# Patient Record
Sex: FEMALE | NOT HISPANIC OR LATINO | Employment: UNEMPLOYED | URBAN - METROPOLITAN AREA
[De-identification: names, ages, dates, MRNs, and addresses within clinical notes are randomized per-mention and may not be internally consistent; named-entity substitution may affect disease eponyms.]

---
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
## 2024-04-17 ENCOUNTER — PROCEDURE VISIT (OUTPATIENT)
Dept: OBGYN CLINIC | Facility: MEDICAL CENTER | Age: 19
End: 2024-04-17
Payer: COMMERCIAL

## 2024-04-17 VITALS
SYSTOLIC BLOOD PRESSURE: 110 MMHG | HEIGHT: 66 IN | DIASTOLIC BLOOD PRESSURE: 70 MMHG | BODY MASS INDEX: 28.45 KG/M2 | WEIGHT: 177 LBS

## 2024-04-17 DIAGNOSIS — Z30.432 ENCOUNTER FOR IUD REMOVAL: Primary | ICD-10-CM

## 2024-04-17 PROCEDURE — 58301 REMOVE INTRAUTERINE DEVICE: CPT | Performed by: OBSTETRICS & GYNECOLOGY

## 2024-04-17 NOTE — PROGRESS NOTES
"    Iud removal    Date/Time: 4/17/2024 10:45 AM    Performed by: Cony Alanis MD  Authorized by: Cony Alanis MD  Universal Protocol:  Consent: Verbal consent obtained.  Risks and benefits: risks, benefits and alternatives were discussed  Consent given by: parent and patient  Time out: Immediately prior to procedure a \"time out\" was called to verify the correct patient, procedure, equipment, support staff and site/side marked as required.  Patient understanding: patient states understanding of the procedure being performed  Patient consent: the patient's understanding of the procedure matches consent given  Procedure consent: procedure consent matches procedure scheduled  Patient identity confirmed: verbally with patient    Procedure:     Removed with no complications: yes    Comments:      Patient declines any hormonal birth control for now.  Will use condoms with all intercourse.  Desires to monitor her menses for now.  Recommend patient start routine yearly GYN examx and to make this visit in 3 months.  Patient's mother inquired about patient's history of urinary tract infections.  Recommend if patient is symptomatic to try with cranberry juice and increasing p.o. fluids.  If symptoms persist, recommend patient contact the office to determine follow-up.     "

## 2024-05-31 ENCOUNTER — OCCMED (OUTPATIENT)
Dept: URGENT CARE | Facility: CLINIC | Age: 19
End: 2024-05-31

## 2024-05-31 ENCOUNTER — APPOINTMENT (OUTPATIENT)
Dept: LAB | Facility: CLINIC | Age: 19
End: 2024-05-31

## 2024-05-31 DIAGNOSIS — Z02.1 PRE-EMPLOYMENT EXAMINATION: ICD-10-CM

## 2024-05-31 DIAGNOSIS — Z02.1 PHYSICAL EXAM, PRE-EMPLOYMENT: Primary | ICD-10-CM

## 2024-05-31 LAB
MEV IGG SER QL IA: ABNORMAL
MUV IGG SER QL IA: NORMAL
RUBV IGG SERPL IA-ACNC: 34.8 IU/ML
VZV IGG SER QL IA: ABNORMAL

## 2024-05-31 PROCEDURE — 86735 MUMPS ANTIBODY: CPT

## 2024-05-31 PROCEDURE — 36415 COLL VENOUS BLD VENIPUNCTURE: CPT

## 2024-05-31 PROCEDURE — 86762 RUBELLA ANTIBODY: CPT

## 2024-05-31 PROCEDURE — 86787 VARICELLA-ZOSTER ANTIBODY: CPT

## 2024-05-31 PROCEDURE — 86480 TB TEST CELL IMMUN MEASURE: CPT

## 2024-05-31 PROCEDURE — 86765 RUBEOLA ANTIBODY: CPT

## 2024-06-01 LAB
GAMMA INTERFERON BACKGROUND BLD IA-ACNC: 0.05 IU/ML
M TB IFN-G BLD-IMP: NEGATIVE
M TB IFN-G CD4+ BCKGRND COR BLD-ACNC: 0.01 IU/ML
M TB IFN-G CD4+ BCKGRND COR BLD-ACNC: 0.02 IU/ML
MITOGEN IGNF BCKGRD COR BLD-ACNC: 9.95 IU/ML

## 2024-09-12 ENCOUNTER — TELEPHONE (OUTPATIENT)
Age: 19
End: 2024-09-12

## 2024-09-12 NOTE — TELEPHONE ENCOUNTER
Called patient, left non-detailed vm. Pt submitted online appt request, currently established with [facility] Advised to return call if assistance is needed with scheduling

## 2024-10-24 ENCOUNTER — TELEPHONE (OUTPATIENT)
Dept: FAMILY MEDICINE CLINIC | Facility: CLINIC | Age: 19
End: 2024-10-24

## 2024-11-06 ENCOUNTER — TELEPHONE (OUTPATIENT)
Dept: FAMILY MEDICINE CLINIC | Facility: CLINIC | Age: 19
End: 2024-11-06

## 2025-01-02 ENCOUNTER — APPOINTMENT (OUTPATIENT)
Dept: LAB | Facility: HOSPITAL | Age: 20
End: 2025-01-02
Payer: COMMERCIAL

## 2025-01-02 ENCOUNTER — OFFICE VISIT (OUTPATIENT)
Dept: FAMILY MEDICINE CLINIC | Facility: CLINIC | Age: 20
End: 2025-01-02
Payer: COMMERCIAL

## 2025-01-02 VITALS
HEART RATE: 84 BPM | DIASTOLIC BLOOD PRESSURE: 80 MMHG | SYSTOLIC BLOOD PRESSURE: 112 MMHG | OXYGEN SATURATION: 100 % | TEMPERATURE: 97.8 F

## 2025-01-02 DIAGNOSIS — Z32.01 POSITIVE PREGNANCY TEST: ICD-10-CM

## 2025-01-02 DIAGNOSIS — Z00.00 ANNUAL PHYSICAL EXAM: Primary | ICD-10-CM

## 2025-01-02 LAB — B-HCG SERPL-ACNC: ABNORMAL MIU/ML (ref 0–5)

## 2025-01-02 PROCEDURE — 36415 COLL VENOUS BLD VENIPUNCTURE: CPT

## 2025-01-02 PROCEDURE — 84702 CHORIONIC GONADOTROPIN TEST: CPT

## 2025-01-02 PROCEDURE — 99395 PREV VISIT EST AGE 18-39: CPT

## 2025-01-02 NOTE — LETTER
January 2, 2025     Patient: Jody Randhawa  YOB: 2005  Date of Visit: 1/2/2025      To Whom it May Concern:    Jody Randhawa is under my professional care. Jody was seen in my office on 1/2/2025. Jody may return to work with limitations 01/02/2025 . Patient was diagnosed with pregnancy (Z32.01) on 01/02/24. Please make reasonable accommodations to minimize fetal risk and exposure. Please allow patient to only handle medications safe for pregnancy based on category.     If you have any questions or concerns, please don't hesitate to call.         Sincerely,          USMAN Murray  Office visit on 01/02/24       CC: No Recipients

## 2025-01-02 NOTE — PATIENT INSTRUCTIONS
Patient Education     Pregnancy - The First Month   About this topic   It is important for you to learn how to take care of yourself to help you have a healthy baby and safe delivery. It is good to have health care throughout your pregnancy.  Your pregnancy is counted in many ways, by weeks, months, or trimesters. Your doctor will count your pregnancy in weeks from the first day of your last period. Pregnancy most often lasts 40 weeks or about 9 months. Your first trimester is from week 1 to 13. The second trimester is from week 14 through 27, and the last one is from week 28 through birth. By knowing how far along you are, you can learn what is normal for this stage of your pregnancy and plan for what is next.  General   Your Body   Your body changes and the baby grows inside of you, even before you may know you are pregnant. In the first and second week after your last period, your body is releasing an egg from your ovary. This is called ovulation. As the egg travels down the fallopian tube, if it is met by a sperm, the egg may become fertilized and you are pregnant.  Your doctor will focus on what changes in your body and how your baby grows. During the first month of your pregnancy, here are some things you can expect.  You may:  Miss your period, have some spotting, or have a lighter period  Feel tired  Go to the bathroom often  Have tender or swollen breasts  Have upset stomach or throwing up  Your baby’s growth and development:  Your baby starts as just 2 cells that divide very quickly. About 3 weeks after your last period, the cells will implant into the wall of your uterus.  Cells are dividing into layers that will become your baby's organs, as well as the placenta and umbilical cord.  Your baby is smaller than the head of a pin, but grows very fast.  Things to Think About   Avoid alcohol, drugs, tobacco products, and second hand smoke  Talk to your doctor about all the drugs you take. Be sure to include  all prescription and over-the-counter (OTC) drugs, and herbal supplements.  Ask about taking a vitamin. Take at least 400 micrograms of folic acid each day to help prevent some birth defects.  During your first visit, you may have blood drawn, a pelvic exam, and an ultrasound.  When do I need to call the doctor?   Not able to stop throwing up  Belly pain or cramps that keeps you from eating or sleeping  Very heavy menstrual bleeding  Last Reviewed Date   2020-04-20  Consumer Information Use and Disclaimer   This generalized information is a limited summary of diagnosis, treatment, and/or medication information. It is not meant to be comprehensive and should be used as a tool to help the user understand and/or assess potential diagnostic and treatment options. It does NOT include all information about conditions, treatments, medications, side effects, or risks that may apply to a specific patient. It is not intended to be medical advice or a substitute for the medical advice, diagnosis, or treatment of a health care provider based on the health care provider's examination and assessment of a patient’s specific and unique circumstances. Patients must speak with a health care provider for complete information about their health, medical questions, and treatment options, including any risks or benefits regarding use of medications. This information does not endorse any treatments or medications as safe, effective, or approved for treating a specific patient. UpTo[employer], Inc. and its affiliates disclaim any warranty or liability relating to this information or the use thereof. The use of this information is governed by the Terms of Use, available at https://www.[employer]wer.com/en/know/clinical-effectiveness-terms   Copyright   Copyright © 2024 UpToDate, Inc. and its affiliates and/or licensors. All rights reserved.

## 2025-01-02 NOTE — PROGRESS NOTES
Adult Annual Physical  Name: Jody Randhawa      : 2005      MRN: [id number]  Encounter Provider: USMAN Murray  Encounter Date: 2025   Encounter department: [facility]    Assessment & Plan  Annual physical exam         Positive pregnancy test     Jody Randhawa is a 19 y.o. female who presents for evaluation of amenorrhea. She believes she could be pregnant. Patient is ambivalent about pregnancy. Sexual Activity: single partner, contraception: none. Current symptoms also include: positive home pregnancy test. Last period was normal.     LMP 2024     Urine HCG: positive      Refer to OBGYN for further management   Start prenatal today   Orders:    hCG, quantitative; Future    Immunizations and preventive care screenings were discussed with patient today. Appropriate education was printed on patient's after visit summary.    Counseling:  Alcohol/drug use: discussed moderation in alcohol intake, the recommendations for healthy alcohol use, and avoidance of illicit drug use.  Dental Health: discussed importance of regular tooth brushing, flossing, and dental visits.  Injury prevention: discussed safety/seat belts, safety helmets, smoke detectors, carbon monoxide detectors, and smoking near bedding or upholstery.  Sexual health: discussed sexually transmitted diseases, partner selection, use of condoms, avoidance of unintended pregnancy, and contraceptive alternatives.  Exercise: the importance of regular exercise/physical activity was discussed. Recommend exercise 3-5 times per week for at least 30 minutes.          History of Present Illness     Adult Annual Physical:  Patient presents for annual physical.     Diet and Physical Activity:  - Diet/Nutrition: well balanced diet.  - Exercise: no formal exercise.    Depression Screening:  - PHQ-2 Score: 0    General Health:  - Sleep: sleeps well.  - Hearing: normal hearing bilateral ears.  - Vision: vision  problems, goes for regular eye exams and wears glasses.  - Dental: regular dental visits and brushes teeth twice daily.    /GYN Health:  - Follows with GYN: yes.   - Last menstrual cycle: 11/18/2024.   - History of STDs: no    Advanced Care Planning:  - Has an advanced directive?: no    - Has a durable medical POA?: no    - ACP document given to patient?: no      Review of Systems   Constitutional:  Negative for activity change, chills, fatigue and fever.   HENT:  Negative for congestion, ear pain, rhinorrhea, sore throat and trouble swallowing.    Eyes:  Negative for pain and visual disturbance.   Respiratory:  Negative for cough, chest tightness and shortness of breath.    Cardiovascular:  Negative for chest pain, palpitations and leg swelling.   Gastrointestinal:  Negative for abdominal pain, constipation, diarrhea, nausea and vomiting.   Genitourinary:  Negative for difficulty urinating, dysuria, hematuria and urgency.   Musculoskeletal:  Negative for arthralgias and back pain.   Skin:  Negative for color change and rash.   Neurological:  Negative for dizziness, seizures, syncope and headaches.   Psychiatric/Behavioral:  Negative for dysphoric mood. The patient is not nervous/anxious.    All other systems reviewed and are negative.    Medical History Reviewed by provider this encounter:     .    Objective   /80 (BP Location: Left arm, Patient Position: Sitting, Cuff Size: Adult)   Pulse 84   Temp 97.8 °F (36.6 °C) (Temporal)   SpO2 100%     Physical Exam  Vitals and nursing note reviewed.   Constitutional:       General: She is not in acute distress.     Appearance: Normal appearance. She is well-developed and normal weight.   HENT:      Head: Normocephalic and atraumatic.      Right Ear: Tympanic membrane, ear canal and external ear normal. There is no impacted cerumen.      Left Ear: Tympanic membrane, ear canal and external ear normal. There is no impacted cerumen.      Nose: Nose normal.       Mouth/Throat:      Mouth: Mucous membranes are moist.      Pharynx: Oropharynx is clear.   Eyes:      Extraocular Movements: Extraocular movements intact.      Conjunctiva/sclera: Conjunctivae normal.      Pupils: Pupils are equal, round, and reactive to light.   Cardiovascular:      Rate and Rhythm: Normal rate and regular rhythm.      Pulses: Normal pulses.      Heart sounds: Normal heart sounds. No murmur heard.  Pulmonary:      Effort: Pulmonary effort is normal. No respiratory distress.      Breath sounds: Normal breath sounds.   Abdominal:      General: Bowel sounds are normal.      Palpations: Abdomen is soft.      Tenderness: There is no abdominal tenderness.   Musculoskeletal:         General: Normal range of motion.      Cervical back: Normal range of motion and neck supple.      Right lower leg: No edema.      Left lower leg: No edema.   Skin:     General: Skin is warm and dry.      Capillary Refill: Capillary refill takes less than 2 seconds.   Neurological:      General: No focal deficit present.      Mental Status: She is alert and oriented to person, place, and time. Mental status is at baseline.   Psychiatric:         Mood and Affect: Mood normal.         Behavior: Behavior normal.         Thought Content: Thought content normal.         Judgment: Judgment normal.     Administrative Statements   I have spent a total time of 40 minutes in caring for this patient on the day of the visit/encounter including Diagnostic results, Prognosis, Risks and benefits of tx options, Instructions for management, Patient and family education, Importance of tx compliance, Risk factor reductions, Impressions, Counseling / Coordination of care, Documenting in the medical record, Reviewing / ordering tests, medicine, procedures  , and Obtaining or reviewing history  .     Patient Instructions   Patient Education     Pregnancy - The First Month   About this topic   It is important for you to learn how to take care of  yourself to help you have a healthy baby and safe delivery. It is good to have health care throughout your pregnancy.  Your pregnancy is counted in many ways, by weeks, months, or trimesters. Your doctor will count your pregnancy in weeks from the first day of your last period. Pregnancy most often lasts 40 weeks or about 9 months. Your first trimester is from week 1 to 13. The second trimester is from week 14 through 27, and the last one is from week 28 through birth. By knowing how far along you are, you can learn what is normal for this stage of your pregnancy and plan for what is next.  General   Your Body   Your body changes and the baby grows inside of you, even before you may know you are pregnant. In the first and second week after your last period, your body is releasing an egg from your ovary. This is called ovulation. As the egg travels down the fallopian tube, if it is met by a sperm, the egg may become fertilized and you are pregnant.  Your doctor will focus on what changes in your body and how your baby grows. During the first month of your pregnancy, here are some things you can expect.  You may:  Miss your period, have some spotting, or have a lighter period  Feel tired  Go to the bathroom often  Have tender or swollen breasts  Have upset stomach or throwing up  Your baby’s growth and development:  Your baby starts as just 2 cells that divide very quickly. About 3 weeks after your last period, the cells will implant into the wall of your uterus.  Cells are dividing into layers that will become your baby's organs, as well as the placenta and umbilical cord.  Your baby is smaller than the head of a pin, but grows very fast.  Things to Think About   Avoid alcohol, drugs, tobacco products, and second hand smoke  Talk to your doctor about all the drugs you take. Be sure to include all prescription and over-the-counter (OTC) drugs, and herbal supplements.  Ask about taking a vitamin. Take at least 400  micrograms of folic acid each day to help prevent some birth defects.  During your first visit, you may have blood drawn, a pelvic exam, and an ultrasound.  When do I need to call the doctor?   Not able to stop throwing up  Belly pain or cramps that keeps you from eating or sleeping  Very heavy menstrual bleeding  Last Reviewed Date   2020-04-20  Consumer Information Use and Disclaimer   This generalized information is a limited summary of diagnosis, treatment, and/or medication information. It is not meant to be comprehensive and should be used as a tool to help the user understand and/or assess potential diagnostic and treatment options. It does NOT include all information about conditions, treatments, medications, side effects, or risks that may apply to a specific patient. It is not intended to be medical advice or a substitute for the medical advice, diagnosis, or treatment of a health care provider based on the health care provider's examination and assessment of a patient’s specific and unique circumstances. Patients must speak with a health care provider for complete information about their health, medical questions, and treatment options, including any risks or benefits regarding use of medications. This information does not endorse any treatments or medications as safe, effective, or approved for treating a specific patient. UpToDate, Inc. and its affiliates disclaim any warranty or liability relating to this information or the use thereof. The use of this information is governed by the Terms of Use, available at https://www.[employer].com/en/know/clinical-effectiveness-terms   Copyright   Copyright © 2024 UpToDate, Inc. and its affiliates and/or licensors. All rights reserved.

## 2025-01-03 ENCOUNTER — TELEPHONE (OUTPATIENT)
Age: 20
End: 2025-01-03

## 2025-01-03 NOTE — TELEPHONE ENCOUNTER
Pt called in , advised pt if she had taken a look at her results on my chart pt stated yes ,She had further questions as to how far along she was and what should be her next step. Explained pcp was no longer in office for the day and we will get back to her .

## 2025-01-03 NOTE — TELEPHONE ENCOUNTER
Patient called requesting a call back from USMAN Murray to discuss her hCG, quantitative lab results. Please call her to advise.

## 2025-01-06 ENCOUNTER — OFFICE VISIT (OUTPATIENT)
Dept: FAMILY MEDICINE CLINIC | Facility: CLINIC | Age: 20
End: 2025-01-06
Payer: COMMERCIAL

## 2025-01-06 ENCOUNTER — TELEPHONE (OUTPATIENT)
Age: 20
End: 2025-01-06

## 2025-01-06 VITALS
OXYGEN SATURATION: 99 % | HEART RATE: 97 BPM | BODY MASS INDEX: 28.12 KG/M2 | WEIGHT: 175 LBS | HEIGHT: 66 IN | SYSTOLIC BLOOD PRESSURE: 126 MMHG | TEMPERATURE: 98.4 F | RESPIRATION RATE: 18 BRPM | DIASTOLIC BLOOD PRESSURE: 82 MMHG

## 2025-01-06 DIAGNOSIS — Z32.01 POSITIVE PREGNANCY TEST: Primary | ICD-10-CM

## 2025-01-06 PROCEDURE — 99213 OFFICE O/P EST LOW 20 MIN: CPT

## 2025-01-06 NOTE — PROGRESS NOTES
Name: Jody Randhawa      : 2005      MRN: [id number]  Encounter Provider: USMAN Murray  Encounter Date: 2025   Encounter department: [facility]  :  Assessment & Plan  Positive pregnancy test   Jody Randhawa is a 19 y.o. female who presents for evaluation of amenorrhea. She believes she could be pregnant. Patient is ambivalent about pregnancy. Sexual Activity: single partner, contraception: none. Current symptoms also include: positive home pregnancy test. Last period was normal.     LMP 2024 Urine HCG: positive   Quantitative HC,962.7  Orders:    Ambulatory Referral to Obstetrics / Gynecology; Future          Depression Screening and Follow-up Plan: Patient was screened for depression during today's encounter. They screened negative with a PHQ-2 score of 0.      History of Present Illness     Follow up exam       Review of Systems   Constitutional:  Negative for activity change, chills, fatigue and fever.   HENT:  Negative for congestion, ear pain, rhinorrhea, sore throat and trouble swallowing.    Eyes:  Negative for pain and visual disturbance.   Respiratory:  Negative for cough, chest tightness and shortness of breath.    Cardiovascular:  Negative for chest pain, palpitations and leg swelling.   Gastrointestinal:  Negative for abdominal pain, constipation, diarrhea, nausea and vomiting.   Genitourinary:  Negative for difficulty urinating, dysuria, hematuria and urgency.   Musculoskeletal:  Negative for arthralgias and back pain.   Skin:  Negative for color change and rash.   Neurological:  Negative for dizziness, seizures, syncope and headaches.   Psychiatric/Behavioral:  Negative for dysphoric mood. The patient is not nervous/anxious.    All other systems reviewed and are negative.      Objective   /82 (BP Location: Left arm, Patient Position: Sitting, Cuff Size: Standard)   Pulse 97   Temp 98.4 °F (36.9 °C)  "(Temporal)   Resp 18   Ht 5' 5.5\" (1.664 m)   Wt 79.4 kg (175 lb)   SpO2 99%   BMI 28.68 kg/m²      Physical Exam  Vitals and nursing note reviewed.   Constitutional:       General: She is not in acute distress.     Appearance: Normal appearance. She is well-developed and normal weight.   HENT:      Head: Normocephalic and atraumatic.      Right Ear: Tympanic membrane, ear canal and external ear normal. There is no impacted cerumen.      Left Ear: Tympanic membrane, ear canal and external ear normal. There is no impacted cerumen.      Nose: Nose normal.      Mouth/Throat:      Mouth: Mucous membranes are moist.      Pharynx: Oropharynx is clear.   Eyes:      Extraocular Movements: Extraocular movements intact.      Conjunctiva/sclera: Conjunctivae normal.      Pupils: Pupils are equal, round, and reactive to light.   Cardiovascular:      Rate and Rhythm: Normal rate and regular rhythm.      Pulses: Normal pulses.      Heart sounds: Normal heart sounds. No murmur heard.  Pulmonary:      Effort: Pulmonary effort is normal. No respiratory distress.      Breath sounds: Normal breath sounds.   Abdominal:      General: Bowel sounds are normal.      Palpations: Abdomen is soft.      Tenderness: There is no abdominal tenderness.   Musculoskeletal:         General: Normal range of motion.      Cervical back: Normal range of motion and neck supple.      Right lower leg: No edema.      Left lower leg: No edema.   Skin:     General: Skin is warm and dry.      Capillary Refill: Capillary refill takes less than 2 seconds.   Neurological:      General: No focal deficit present.      Mental Status: She is alert and oriented to person, place, and time. Mental status is at baseline.   Psychiatric:         Mood and Affect: Mood normal.         Behavior: Behavior normal.         Thought Content: Thought content normal.         Judgment: Judgment normal.       Administrative Statements   I have spent a total time of 20 minutes in " caring for this patient on the day of the visit/encounter including Diagnostic results, Prognosis, Risks and benefits of tx options, Instructions for management, Patient and family education, Importance of tx compliance, Risk factor reductions, Impressions, Counseling / Coordination of care, Documenting in the medical record, Reviewing / ordering tests, medicine, procedures  , and Obtaining or reviewing history  .

## 2025-01-06 NOTE — ASSESSMENT & PLAN NOTE
Jody Randhawa is a 19 y.o. female who presents for evaluation of amenorrhea. She believes she could be pregnant. Patient is ambivalent about pregnancy. Sexual Activity: single partner, contraception: none. Current symptoms also include: positive home pregnancy test. Last period was normal.     LMP 2024 Urine HCG: positive   Quantitative HC,962.7  Orders:    Ambulatory Referral to Obstetrics / Gynecology; Future

## 2025-01-06 NOTE — TELEPHONE ENCOUNTER
Already a current pt ref by PCP for a + preg test LMP 11/18/24 first available for Data and Via is 2/20/25 pt will be way over her 10 weeks by this appt. Please call pt to sched sooner as she is 7 weeks today per LMP pt did state PCP told her she is about 5 weeks but preg wheel is showing 7 weeks today.

## 2025-01-06 NOTE — LETTER
January 6, 2025     Patient: Jody Randhawa  YOB: 2005  Date of Visit: 1/6/2025      To Whom it May Concern:    Jody Randhawa is under my professional care. Jody was seen in my office on 1/6/2025.   Jody may return to work with limitations 01/06/2025 . Patient was diagnosed with pregnancy (Z32.01) on 01/02/24. Please make reasonable accommodations to minimize fetal risk and exposure, allow for 15 minute breaks twice a shift within first and second ramirez. Please allow patient to only handle medications safe for pregnancy based on category.         If you have any questions or concerns, please don't hesitate to call.         Sincerely,          USMAN Murray        CC: No Recipients

## 2025-01-08 ENCOUNTER — TELEPHONE (OUTPATIENT)
Age: 20
End: 2025-01-08

## 2025-01-08 NOTE — TELEPHONE ENCOUNTER
Addressed today.  Letter created and handed to patient   fever for 3 days, febrile seizure yesterday, seen at [facility] and obs'd and d/c to home; here today b/c fever continues; good PO and UOP    lungs clear, wet cough noted, congestion noted

## 2025-01-08 NOTE — TELEPHONE ENCOUNTER
"Pt needs an update on this letter as soon as possible to provide before her shift at 3pm today. If the last line on parking accommodations can specifically state \" patient must be allowed to park in the visitor parking lot including handicap spaces if the expectant mother spaces are not available\"    Please fax asap to pt's work [phone]   "

## 2025-01-08 NOTE — TELEPHONE ENCOUNTER
Patient called and stated unfortunately her employer is not accepting her letter from pcp in regards to making accommodations based on patients recent diagnosis. Patient stated they are requiring patient to have specifics listed in the letter. Patient made an appointment this afternoon with pcp to discuss further.

## 2025-02-06 ENCOUNTER — ULTRASOUND (OUTPATIENT)
Dept: OBGYN CLINIC | Facility: MEDICAL CENTER | Age: 20
End: 2025-02-06
Payer: COMMERCIAL

## 2025-02-06 ENCOUNTER — TELEPHONE (OUTPATIENT)
Age: 20
End: 2025-02-06

## 2025-02-06 VITALS
HEIGHT: 66 IN | DIASTOLIC BLOOD PRESSURE: 60 MMHG | BODY MASS INDEX: 29.09 KG/M2 | WEIGHT: 181 LBS | SYSTOLIC BLOOD PRESSURE: 110 MMHG

## 2025-02-06 DIAGNOSIS — Z32.01 PREGNANCY EXAMINATION OR TEST, POSITIVE RESULT: Primary | ICD-10-CM

## 2025-02-06 DIAGNOSIS — Z32.01 POSITIVE PREGNANCY TEST: ICD-10-CM

## 2025-02-06 PROCEDURE — 76817 TRANSVAGINAL US OBSTETRIC: CPT | Performed by: OBSTETRICS & GYNECOLOGY

## 2025-02-06 PROCEDURE — 99214 OFFICE O/P EST MOD 30 MIN: CPT | Performed by: OBSTETRICS & GYNECOLOGY

## 2025-02-06 NOTE — PROGRESS NOTES
"Pregnancy Confirmation Visit  [facility] [address], [place], [place]    Assessment/Plan:  19 y.o.  presenting with missed menses.      Patient's last menstrual period was 2024 (exact date).  EDC - 25   @ 11w1d  Sonogram EDC        Final EDC 25    by    LMP            - Continue/start prenatal vitamin  - We reviewed her current medications and discussed which are safe to continue in pregnancy  - We briefly discussed options for aneuploidy screening, to be discussed further at the prenatal intake  - Schedule prenatal intake with RN and initial prenatal visit; prenatal labs will be ordered during the prenatal intake      Subjective:    CC: Missed period    Jody Randhawa is a 19 y.o.  who presents with missed menses.  Patient's last menstrual period was 2024 (exact date).    Patient notes that this pregnancy was planned and desired.  She was not using contraception at the time of conception. She reports she is certain of her LMP and that she has regular menses. She has has no vaginal bleeding since her LMP.    Objective:  /60   Ht 5' 5.5\" (1.664 m)   Wt 82.1 kg (181 lb)   LMP 2024 (Exact Date)   BMI 29.66 kg/m²     Physical Exam:  General: Well appearing, no distress  CV: Regular rate  Respiratory: Unlabored breathing  Abdomen: Soft, nontender  Extremities: Without edema  Mood and Affect: Appropriate    Transvaginal Pelvic Ultrasound  Cooper IUP  Yolk sac: Present  Fetal Pole: Present  CRL consistent with EGA   Cardiac activity: Present   bpm  No adnexal masses appreciated          "

## 2025-02-06 NOTE — TELEPHONE ENCOUNTER
Patient asking for another referral for OBGYN that can get her in for an appt today. She states she was not able to be seen by the original referring doctor.

## 2025-02-06 NOTE — TELEPHONE ENCOUNTER
Due to inclement weather office has a later start today , I will be forwarding your message to pcp

## 2025-02-07 NOTE — PROGRESS NOTES
OB INTAKE INTERVIEW February 10, 2025    Patient is 19 y.o. who presents for OB intake at 12w0d.  She is accompanied by her mother during this encounter.  The father of her baby (Indra Curtis) is involved in the pregnancy.      Patient's last menstrual period was 2024 (exact date).  Ultrasound: Measured 11 weeks 1 days on 2025   Estimated Date of Delivery: 25 confirmed by dating ultrasound. 11 week US by NEL    Signs/Symptoms of Pregnancy  Current pregnancy symptoms: breast tenderness, fatigue, nausea, and frequent urination  Headaches: YES - relieved w/fluids and rest  Cramping: YES - light/occasional  Spotting: no  PICA cravings: no    Diabetes:  Body mass index is 29.66 kg/m².  If patient has 1 or more, please order early 1 hour GTT  History of GDM: no  BMI >35 no  History of PCOS or current metformin use: no  History of LGA/macrosomic infant (4000g/9lbs): no    If patient has 2 or more, please order early 1 hour GTT  BMI>30 no - pregravid BMI 29.6  AMA: no  First degree relative with type 2 diabetes: YES  History of chronic HTN, hyperlipidemia, elevated A1C: no  High risk race (, , ,  or ): YES - pt     Hypertension: if you answer yes to any of the following, please order baseline preeclampsia labs (cbc, comprehensive metabolic panel, urine protein creatinine ratio, uric acid)  History of of chronic HTN: no  History of gestational HTN: no  History of preeclampsia, eclampsia, or HELLP syndrome: no  History of diabetes: no  History of lupus,sjogrens syndrome, kidney disease no    Thyroid: if yes order TSH with reflex T4  History of thyroid disease: no    Bleeding Disorder or Hx of DVT - patient or first degree relative with history of. Order the following if not done previously.   (Factor V, antithrombin III, prothrombin gene mutation, protein C and S Ag, lupus anticoagulant, anticardiolipin, beta-2  glycoprotein):   no    OB/GYN:  History of abnormal pap smear: no       Date of last pap smear: Never had pap  History of HPV: no  History of Herpes/HSV: no  History of other STI: (gonorrhea, chlamydia, trich) no  History of prior : no  History of prior : no  History of  delivery prior to 36 weeks 6 days: no  History of blood transfusion: no  Ok for blood transfusion: YES    Substance screening-   History of tobacco use no  Currently using tobacco no  Substance Use Screen Level:  No Risk    MRSA Screening:   Does the pt have a hx of MRSA? no    Mental Health:  Hx of/or current dx of depression: no  Hx of/or current dx of anxiety: no  Medications: no   EPDS Screen:  Negative / score: 1    Dental Health:  Patient has seen a dentist in the past 6 months: no    Immunizations:  Influenza vaccine given this season: NO   Discussed Tdap vaccine:  YES  Discussed COVID Vaccine:  YES -had in the past  History of Varicella or Vaccination had varicella vaccine    Genetic/M:  Do you or your partner have a history of any of the following in yourselves or first degree relatives?  Cystic fibrosis: no  Spinal muscular atrophy: no  Hemoglobinopathy/Sickle Cell/Thalassemia: no  Fragile X Intellectual Disability: no    Discussed Carrier Screening being completed once in a lifetime as a standard of care lab test. Place orders for Cystic Fibrosis Gene Test (OHQ854) and Spinal Muscular Atrophy DNA (FOD4410).  Patient was informed that prior authorization needs to be completed for these tests and this may take 7-10 business days.  Patient does not desire testing for Cystic Fibrosis and Spinal Muscular Atrophy at this time.    ACOG Patient Education Cystic Fibrosis and Spinal Muscular Atrophy prenatal screening given.    Appointment for Nuchal Translucency Ultrasound at [facility] is scheduled for 25.    Interview education:  [facility] Pregnancy Essentials Book reviewed, discussed and attached to their AVS: YES      Nurse/Family Partnership-patient may qualify YES; referral placed NO - discussed, brochure given    Prenatal lab work scripts: YES - at D/V    Extra labs ordered: 1 hour GTT    Aspirin/Preeclampsia Screen    Risk Level Risk Factor Recommendation   LOW Prior Uncomplicated full-term delivery no No Aspirin recommendation        MODERATE Nulliparity YES Recommend low-dose aspirin if     BMI>30 no - pregravid BMI 29.66 2 or more moderate risk factors    Family History Preeclampsia (mother/sister) no     35yr old or greater no     Black Race, Concern for SDOH/Low Socioeconomic no     IVF Pregnancy  no     Personal History Risks (low birth weight, prior adverse preg outcome, >10yr preg interval) no         HIGH History of Preeclampsia no Recommend low-dose aspirin if     Multifetal gestation no 1 or more high risk factors    Chronic HTN no     Type 1 or 2 Diabetes no     Renal Disease no     Autoimmune Disease  no      Contraindications to ASA therapy:  NSAID/ ASA allergy: no  Nasal polyps: no  Asthma with history of ASA induced bronchospasm: no    Relative contraindications:  History of GI bleed: no  Active peptic ulcer disease: no  Severe hepatic dysfunction: no    Patient does not meet recommendation to take ASA 162mg during this pregnancy from 12-36 wks to lower her risk of preeclampsia.  Instructions given and patient verbalized understanding.    The patient has a history now or in prior pregnancy notable for:  family hx DM, pregravid BMI 29.66       Details that I feel the provider should be aware of: Jody was seen here in office for her OB Intake visit, Hx obtained, c/o slight nausea  discussed avoiding an empty belly.  Discussed need for 2 tabs LDASA therapy d/t elevated pre-e risk, pt verbalized understanding.  Will start after discussing w/provider at next PNV. Reviewed medications safe to take in pregnancy.  Discussed NFP, brochure reviewed, pt states she will think about it.  [facility] Essentials packet/link  reviewed. [facility] Baby and Me classes reviewed and how to register for classes. Reviewed timing of prenatal lab draw, verbalized understanding.     PN1 visit scheduled. The patient was oriented to our practice, the navigator role, reviewed delivering physicians and [facility] for delivery. All questions were answered.    Interviewed by: Octavia Farley RN

## 2025-02-07 NOTE — PATIENT INSTRUCTIONS
Congratulations on your pregnancy!  We thank you for allowing us to participate in your care.    NEXT STEPS    Go to the lab to have your prenatal bloodwork competed if you have not already done so.  There is a listing of [facility] and locations in your prenatal folder. You may also visit [email or web address] or call [phone].   Please be aware that some insurance companies may require you to go to a specific lab (ex. [facility] or [facility]). You can verify this by contacting your insurance company.   If you have decided to be screened for CF and SMA genetic testing, these tests require prior authorization and scheduling.  Prior Authorization is not a guarantee of payment. There may be out of pocket expenses that includes copays, deductibles and or coinsurance for your individual plan.  Please call [phone] if our team has not contacted you in 7 business days.  Please have your blood work completed prior to you next prenatal visit.    If you have decided to have genetic testing done at M[facility] Fetal Medicine, that will be scheduled by [facility]. You may have already scheduled this appointment.  If not, please call their office to schedule this appointment.  Based on the referral placed by our office, they will know how to schedule you appropriately.    Contact information for [facility] is located in your prenatal folder. The main phone number to their office is [phone].    Return to our office for your first routine prenatal visit.   [facility] has multiple locations. Always check the address of your appointment to ensure you are going to the correct office.       Warning Signs During Pregnancy - If you experience any problems or concerns, call the office directly.  The list below includes warning signs your providers would like you to be aware of.  If you experience any of these at any time during your pregnancy, please call us as soon as possible.   Vaginal bleeding  Sharp abdominal  pain that does not go away  Fever (more than 100.4?F and is not relieved with Tylenol)  Persistent vomiting lasting greater than 24 hours  Chest pain/Shortness of breath  Pain or burning when you urinate     Call the OFFICE [phone] for any questions/emergencies.  At night or on the weekend, calls go through a triage service, please indicate it is an emergency and the DOCTOR on call will be paged.    Remember to only use MyChart for non-urgent concerns or questions.    Our doctors deliver at [facility] in [place]. The address is provided below.     [address] [address]    Please click on the link below to review our Pregnancy Essential Guide.    [facility] Pregnancy Essentials Guide  [facility] Women's Health ([email or web address])     To learn more about the Prenatal Education classes that [facility] offers, click the link below.  Prenatal Education Classes    Click on the link below to review [facility] Lab locations.  [facility] Lab Locations    [facility] resource  [employer] is a tool to connect you to community resources you may need.      Thank you,   Octavia JUAN, RN  OB Nurse Navigator

## 2025-02-10 ENCOUNTER — INITIAL PRENATAL (OUTPATIENT)
Dept: OBGYN CLINIC | Facility: MEDICAL CENTER | Age: 20
End: 2025-02-10

## 2025-02-10 VITALS
HEIGHT: 66 IN | SYSTOLIC BLOOD PRESSURE: 104 MMHG | WEIGHT: 181 LBS | DIASTOLIC BLOOD PRESSURE: 60 MMHG | BODY MASS INDEX: 29.09 KG/M2

## 2025-02-10 DIAGNOSIS — Z34.01 ENCOUNTER FOR SUPERVISION OF NORMAL FIRST PREGNANCY IN FIRST TRIMESTER: Primary | ICD-10-CM

## 2025-02-10 DIAGNOSIS — O99.210 OBESITY IN PREGNANCY: ICD-10-CM

## 2025-02-10 DIAGNOSIS — Z83.3 FAMILY HISTORY OF DIABETES MELLITUS IN FATHER: ICD-10-CM

## 2025-02-10 PROCEDURE — OBC

## 2025-02-13 ENCOUNTER — APPOINTMENT (OUTPATIENT)
Dept: LAB | Facility: MEDICAL CENTER | Age: 20
End: 2025-02-13
Payer: COMMERCIAL

## 2025-02-13 DIAGNOSIS — Z32.01 PREGNANCY EXAMINATION OR TEST, POSITIVE RESULT: ICD-10-CM

## 2025-02-13 DIAGNOSIS — Z34.01 ENCOUNTER FOR SUPERVISION OF NORMAL FIRST PREGNANCY IN FIRST TRIMESTER: ICD-10-CM

## 2025-02-13 DIAGNOSIS — O99.210 OBESITY IN PREGNANCY: ICD-10-CM

## 2025-02-13 LAB
ABO GROUP BLD: NORMAL
BASOPHILS # BLD AUTO: 0.01 THOUSANDS/ÂΜL (ref 0–0.1)
BASOPHILS NFR BLD AUTO: 0 % (ref 0–1)
BILIRUB UR QL STRIP: NEGATIVE
BLD GP AB SCN SERPL QL: NEGATIVE
CLARITY UR: CLEAR
COLOR UR: COLORLESS
EOSINOPHIL # BLD AUTO: 0.05 THOUSAND/ÂΜL (ref 0–0.61)
EOSINOPHIL NFR BLD AUTO: 1 % (ref 0–6)
ERYTHROCYTE [DISTWIDTH] IN BLOOD BY AUTOMATED COUNT: 13.5 % (ref 11.6–15.1)
GLUCOSE 1H P 50 G GLC PO SERPL-MCNC: 91 MG/DL (ref 70–134)
GLUCOSE UR STRIP-MCNC: NEGATIVE MG/DL
HCT VFR BLD AUTO: 36.1 % (ref 34.8–46.1)
HGB BLD-MCNC: 11.8 G/DL (ref 11.5–15.4)
HGB UR QL STRIP.AUTO: NEGATIVE
IMM GRANULOCYTES # BLD AUTO: 0.03 THOUSAND/UL (ref 0–0.2)
IMM GRANULOCYTES NFR BLD AUTO: 1 % (ref 0–2)
KETONES UR STRIP-MCNC: NEGATIVE MG/DL
LEUKOCYTE ESTERASE UR QL STRIP: NEGATIVE
LYMPHOCYTES # BLD AUTO: 1.43 THOUSANDS/ÂΜL (ref 0.6–4.47)
LYMPHOCYTES NFR BLD AUTO: 26 % (ref 14–44)
MCH RBC QN AUTO: 27.7 PG (ref 26.8–34.3)
MCHC RBC AUTO-ENTMCNC: 32.7 G/DL (ref 31.4–37.4)
MCV RBC AUTO: 85 FL (ref 82–98)
MONOCYTES # BLD AUTO: 0.39 THOUSAND/ÂΜL (ref 0.17–1.22)
MONOCYTES NFR BLD AUTO: 7 % (ref 4–12)
NEUTROPHILS # BLD AUTO: 3.52 THOUSANDS/ÂΜL (ref 1.85–7.62)
NEUTS SEG NFR BLD AUTO: 65 % (ref 43–75)
NITRITE UR QL STRIP: NEGATIVE
NRBC BLD AUTO-RTO: 0 /100 WBCS
PH UR STRIP.AUTO: 7 [PH]
PLATELET # BLD AUTO: 155 THOUSANDS/UL (ref 149–390)
PMV BLD AUTO: 11.7 FL (ref 8.9–12.7)
PROT UR STRIP-MCNC: NEGATIVE MG/DL
RBC # BLD AUTO: 4.26 MILLION/UL (ref 3.81–5.12)
RH BLD: POSITIVE
RUBV IGG SERPL IA-ACNC: 35.7 IU/ML
SP GR UR STRIP.AUTO: 1.01 (ref 1–1.03)
SPECIMEN EXPIRATION DATE: NORMAL
UROBILINOGEN UR STRIP-ACNC: <2 MG/DL
WBC # BLD AUTO: 5.43 THOUSAND/UL (ref 4.31–10.16)

## 2025-02-13 PROCEDURE — 86901 BLOOD TYPING SEROLOGIC RH(D): CPT

## 2025-02-13 PROCEDURE — 86706 HEP B SURFACE ANTIBODY: CPT

## 2025-02-13 PROCEDURE — 36415 COLL VENOUS BLD VENIPUNCTURE: CPT

## 2025-02-13 PROCEDURE — 82950 GLUCOSE TEST: CPT

## 2025-02-13 PROCEDURE — 86850 RBC ANTIBODY SCREEN: CPT

## 2025-02-13 PROCEDURE — 85025 COMPLETE CBC W/AUTO DIFF WBC: CPT

## 2025-02-13 PROCEDURE — 86762 RUBELLA ANTIBODY: CPT

## 2025-02-13 PROCEDURE — 87147 CULTURE TYPE IMMUNOLOGIC: CPT

## 2025-02-13 PROCEDURE — 83020 HEMOGLOBIN ELECTROPHORESIS: CPT

## 2025-02-13 PROCEDURE — 86803 HEPATITIS C AB TEST: CPT

## 2025-02-13 PROCEDURE — 86780 TREPONEMA PALLIDUM: CPT

## 2025-02-13 PROCEDURE — 81003 URINALYSIS AUTO W/O SCOPE: CPT

## 2025-02-13 PROCEDURE — 87340 HEPATITIS B SURFACE AG IA: CPT

## 2025-02-13 PROCEDURE — 87086 URINE CULTURE/COLONY COUNT: CPT

## 2025-02-13 PROCEDURE — 87389 HIV-1 AG W/HIV-1&-2 AB AG IA: CPT

## 2025-02-13 PROCEDURE — 86900 BLOOD TYPING SEROLOGIC ABO: CPT

## 2025-02-14 LAB
BACTERIA UR CULT: ABNORMAL
HBV SURFACE AB SER-ACNC: 3.8 MIU/ML
HBV SURFACE AG SER QL: NORMAL
HCV AB SER QL: NORMAL
HIV 1+2 AB+HIV1 P24 AG SERPL QL IA: NORMAL
TREPONEMA PALLIDUM IGG+IGM AB [PRESENCE] IN SERUM OR PLASMA BY IMMUNOASSAY: NORMAL

## 2025-02-15 LAB
HGB A MFR BLD: 2.6 % (ref 1.8–3.2)
HGB A MFR BLD: 97.4 % (ref 96.4–98.8)
HGB F MFR BLD: 0 % (ref 0–2)
HGB FRACT BLD-IMP: NORMAL
HGB S MFR BLD: 0 %

## 2025-02-19 ENCOUNTER — RESULTS FOLLOW-UP (OUTPATIENT)
Dept: OBGYN CLINIC | Facility: CLINIC | Age: 20
End: 2025-02-19

## 2025-02-20 PROBLEM — Z30.09 BIRTH CONTROL COUNSELING: Status: RESOLVED | Noted: 2022-04-20 | Resolved: 2025-02-20

## 2025-02-21 ENCOUNTER — DOCUMENTATION (OUTPATIENT)
Dept: PERINATAL CARE | Facility: OTHER | Age: 20
End: 2025-02-21

## 2025-02-21 ENCOUNTER — ROUTINE PRENATAL (OUTPATIENT)
Dept: PERINATAL CARE | Facility: OTHER | Age: 20
End: 2025-02-21
Payer: COMMERCIAL

## 2025-02-21 ENCOUNTER — TELEPHONE (OUTPATIENT)
Dept: PERINATAL CARE | Facility: OTHER | Age: 20
End: 2025-02-21

## 2025-02-21 VITALS
WEIGHT: 185.4 LBS | SYSTOLIC BLOOD PRESSURE: 112 MMHG | DIASTOLIC BLOOD PRESSURE: 70 MMHG | BODY MASS INDEX: 29.8 KG/M2 | HEART RATE: 104 BPM | HEIGHT: 66 IN

## 2025-02-21 DIAGNOSIS — Z32.01 PREGNANCY EXAMINATION OR TEST, POSITIVE RESULT: ICD-10-CM

## 2025-02-21 DIAGNOSIS — Z36.82 ENCOUNTER FOR (NT) NUCHAL TRANSLUCENCY SCAN: ICD-10-CM

## 2025-02-21 DIAGNOSIS — Z3A.13 13 WEEKS GESTATION OF PREGNANCY: ICD-10-CM

## 2025-02-21 DIAGNOSIS — O36.80X0 ENCOUNTER TO DETERMINE FETAL VIABILITY OF PREGNANCY, SINGLE OR UNSPECIFIED FETUS: Primary | ICD-10-CM

## 2025-02-21 PROCEDURE — 99243 OFF/OP CNSLTJ NEW/EST LOW 30: CPT | Performed by: NURSE PRACTITIONER

## 2025-02-21 PROCEDURE — 76813 OB US NUCHAL MEAS 1 GEST: CPT | Performed by: OBSTETRICS & GYNECOLOGY

## 2025-02-21 NOTE — PROGRESS NOTES
"[facility]: Jody Randhawa was seen today for nuchal translucency ultrasound.  See ultrasound report under \"OB Procedures\" tab.   Please don't hesitate to contact our office with any concerns or questions.  -Guillermina Cartagena MD    "

## 2025-02-21 NOTE — PROGRESS NOTES
Jody requested NIPT  in our office today and has [employer] insurance. I called [facility] and spoke to Dotty. CPT for NIPT given to Dotty to check if patient needs prior auth for this test. Per Dotty, Authorization is not required and pre-determination is not recommended. Call ref. #[id number]. Will have  staff call [facility] to schedule a nurse visit for the blood draw.

## 2025-03-05 PROBLEM — Z3A.15 15 WEEKS GESTATION OF PREGNANCY: Status: ACTIVE | Noted: 2025-01-06

## 2025-03-05 PROBLEM — R82.71 GBS BACTERIURIA: Status: ACTIVE | Noted: 2025-03-05

## 2025-03-05 PROBLEM — Z78.9 NONIMMUNE TO HEPATITIS B VIRUS: Status: ACTIVE | Noted: 2025-03-05

## 2025-03-05 NOTE — PROGRESS NOTES
Name: Jody Randhawa      : 2005      MRN: [id number]  Encounter Provider: USMAN Huerta  Encounter Date: 3/7/2025   Encounter [facility]: [facility]  :  Assessment & Plan  15 weeks gestation of pregnancy  - Continue prenatal vitamin daily  -Declined genetic screening  - PNC follow-up scheduled 25  - Weight gain in pregnancy- Who BMI pregravid BMI  29.65 recommend 11-20 lb . Healthy diet and daily exercise reviewed and encouraged  - Unit regimen reviewed visit every 4 weeks until 28 weeks, every 2 weeks until 36 weeks and then weekly until delivery.    - Vaccines in Pregnancy      - TDAP vaccine after 27 gestational weeks     - RSV vaccine between 32-36.6 gestational weeks. September- January      - Reviewed with patient  Pregnancy with COVID infection is considered  high risk and can have poor outcome including  delivery, more severe infection.  therefore  pregnant patients are recommended to get  COVID-19 vaccination. Written information provided     - influenza October- March NA  -  Common discomforts of pregnancy and precautions reviewed.  Signs and symptoms to report reviewed.    Orders:    Chlamydia/GC amplified DNA by PCR    GBS bacteriuria  Will need treatment in labor.  No penicillin allergy         Nonimmune to hepatitis B virus  Recommend hepatitis B booster during pregnancy         Prenatal care in second trimester         Screening examination for STI    Orders:    Chlamydia/GC amplified DNA by PCR    RTO 4 weeks     History of Present Illness   HPI  Jody Randhawa is a 19 y.o. female who presents Prenatal H&P     Denies loss of fluid, vaginal discharge, vaginal bleeding  and abdominal pain. Not feeling fetal movement. Tolerating PNV well.   Prenatal panel reviewed significant for hepatitis B nonimmune.  Met with  center for an early ultrasound declined genetic screening.    History obtained from: patient    Review of  Systems   Constitutional:  Negative for chills and fever.   Respiratory: Negative.     Cardiovascular: Negative.    Genitourinary: Negative.      Medical History Reviewed by provider this encounter:  Tobacco  Allergies  Meds  Med Hx  Surg Hx  Fam Hx  Soc Hx    .     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 Reviewed depending on complaint and gestational age may recommend evaluation at [facility]       non-distended bilaterally

## 2025-03-05 NOTE — ASSESSMENT & PLAN NOTE
- Continue prenatal vitamin daily  -Declined genetic screening  - [facility] follow-up scheduled 25  - Weight gain in pregnancy- Who BMI pregravid BMI  29.65 recommend 11-20 lb . Healthy diet and daily exercise reviewed and encouraged  - Unit regimen reviewed visit every 4 weeks until 28 weeks, every 2 weeks until 36 weeks and then weekly until delivery.    - Vaccines in Pregnancy      - TDAP vaccine after 27 gestational weeks     - RSV vaccine between 32-36.6 gestational weeks. September- January      - Reviewed with patient  Pregnancy with COVID infection is considered  high risk and can have poor outcome including  delivery, more severe infection.  therefore  pregnant patients are recommended to get  COVID-19 vaccination. Written information provided     - influenza October- March NA  -  Common discomforts of pregnancy and precautions reviewed.  Signs and symptoms to report reviewed.    Orders:    Chlamydia/GC amplified DNA by PCR

## 2025-03-07 ENCOUNTER — INITIAL PRENATAL (OUTPATIENT)
Dept: OBGYN CLINIC | Facility: MEDICAL CENTER | Age: 20
End: 2025-03-07

## 2025-03-07 VITALS — DIASTOLIC BLOOD PRESSURE: 62 MMHG | WEIGHT: 186.7 LBS | SYSTOLIC BLOOD PRESSURE: 106 MMHG | BODY MASS INDEX: 30.6 KG/M2

## 2025-03-07 DIAGNOSIS — R82.71 GBS BACTERIURIA: ICD-10-CM

## 2025-03-07 DIAGNOSIS — Z78.9 NONIMMUNE TO HEPATITIS B VIRUS: ICD-10-CM

## 2025-03-07 DIAGNOSIS — Z34.92 PRENATAL CARE IN SECOND TRIMESTER: Primary | ICD-10-CM

## 2025-03-07 DIAGNOSIS — Z11.3 SCREENING EXAMINATION FOR STI: ICD-10-CM

## 2025-03-07 DIAGNOSIS — Z3A.15 15 WEEKS GESTATION OF PREGNANCY: ICD-10-CM

## 2025-03-07 PROCEDURE — 87491 CHLMYD TRACH DNA AMP PROBE: CPT | Performed by: NURSE PRACTITIONER

## 2025-03-07 PROCEDURE — PNV: Performed by: NURSE PRACTITIONER

## 2025-03-07 PROCEDURE — 87591 N.GONORRHOEAE DNA AMP PROB: CPT | Performed by: NURSE PRACTITIONER

## 2025-03-10 ENCOUNTER — RESULTS FOLLOW-UP (OUTPATIENT)
Dept: OBGYN CLINIC | Facility: MEDICAL CENTER | Age: 20
End: 2025-03-10

## 2025-03-10 LAB
C TRACH DNA SPEC QL NAA+PROBE: NEGATIVE
N GONORRHOEA DNA SPEC QL NAA+PROBE: NEGATIVE

## 2025-03-13 ENCOUNTER — TELEPHONE (OUTPATIENT)
Age: 20
End: 2025-03-13

## 2025-03-13 NOTE — TELEPHONE ENCOUNTER
Patient called stating that she needs an updated accommodation letter for her employer. Last letter is in chart from 1/9/25.  Patient requests that accommodations stay the same and also include that she is to maintain her 37.5 hr work week with no mandatory overtime.    Please fax to: [phone]

## 2025-03-18 NOTE — TELEPHONE ENCOUNTER
Received call from patient requesting update on accomodation letter  I reviewed provider notes to follow with OBGYN and request this letter from them, Patient verbalized understanding

## 2025-03-26 ENCOUNTER — TELEPHONE (OUTPATIENT)
Age: 20
End: 2025-03-26

## 2025-03-26 NOTE — TELEPHONE ENCOUNTER
Who called:STAFF     Is the patient Pregnant ? Yes  If so, How many weeks? 18wks    Reason for the Call: Patient requested updated work note for accommodations.    Action Taken: Called patient      Outcome/Plan/ Recommendations:  Spoke with patient making her aware the work letter she requested was placed in her MyChart.

## 2025-03-26 NOTE — TELEPHONE ENCOUNTER
Patient 18 weeks 2 days pregnant,  called office requesting updated accommodation letter for her work place. She states the original letter was provided by her PCP, however she was told it now has to come from OBgyn due to being further along in pregnancy. Patient works at a [email or web address].   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     She is requesting the letter be sent to her mychart prior to this weekend to avoid the overtime shift. Advised will send to provider. Patient thankful.

## 2025-04-01 ENCOUNTER — TELEPHONE (OUTPATIENT)
Dept: OBGYN CLINIC | Facility: MEDICAL CENTER | Age: 20
End: 2025-04-01

## 2025-04-01 NOTE — TELEPHONE ENCOUNTER
Attempted to contact patient for 2nd Trimester Check-in Call. Pt unavailable. [employer] msg was sent  3/28/25.  Left msg to reach out w/questions or concerns.

## 2025-04-03 ENCOUNTER — TELEPHONE (OUTPATIENT)
Dept: OBGYN CLINIC | Facility: CLINIC | Age: 20
End: 2025-04-03

## 2025-04-03 NOTE — TELEPHONE ENCOUNTER
Patient missed her 20wk ob appointment today.  Needs to schedule one but I see no openings in any location.  Can someone please check the schedule and call patient.

## 2025-04-03 NOTE — TELEPHONE ENCOUNTER
Who called:STAFF     Is the patient Pregnant ?yes   If so, How many weeks? 20    Reason for the Call:rescheduled her 20weeks     Action Taken:LVM       Outcome/Plan/ Recommendations:  please call back to reschedule.

## 2025-04-04 PROBLEM — Z3A.20 20 WEEKS GESTATION OF PREGNANCY: Status: ACTIVE | Noted: 2025-01-06

## 2025-04-04 NOTE — PROGRESS NOTES
Name: Jody Randhawa      : 2005      MRN: [id number]  Encounter Provider: USMAN Huerta  Encounter Date: 2025   Encounter department: [facility]  :  Assessment & Plan  20 weeks gestation of pregnancy  Continue prenatal vitamin daily  Follow-up with  center scheduled for 25  Common discomforts of pregnancy and precautions reviewed.  Signs and symptoms to report reviewed.           Prenatal care, first pregnancy in second trimester         GBS bacteriuria  Will need treatment in labor.  No penicillin allergy           Nonimmune to hepatitis B virus  Recommend hepatitis B booster during pregnancy             RTO 4 weeks   History of Present Illness   HPI  Jody Randhawa is a 19 y.o. female who presents PNV     Denies loss of fluid, vaginal bleeding and abdominal pain.  Confirms fetal movement.  Tolerating PNV well.  Denies questions or concerns at today's visit.    History obtained from: patient    Review of Systems   Constitutional:  Negative for chills and fever.   Respiratory: Negative.     Cardiovascular: Negative.    Genitourinary: Negative.      Medical History Reviewed by provider this encounter:  Allergies  Problems     .     Objective   /72   Wt 90.3 kg (199 lb)   LMP 2024 (Exact Date)   BMI 32.61 kg/m²      Physical Exam  Constitutional:       Appearance: Normal appearance.   Neurological:      Mental Status: She is alert and oriented to person, place, and time.   Psychiatric:         Mood and Affect: Mood normal.         Behavior: Behavior normal.

## 2025-04-04 NOTE — ASSESSMENT & PLAN NOTE
Continue prenatal vitamin daily  Follow-up with  center scheduled for 25  Common discomforts of pregnancy and precautions reviewed.  Signs and symptoms to report reviewed.

## 2025-04-07 ENCOUNTER — ROUTINE PRENATAL (OUTPATIENT)
Dept: OBGYN CLINIC | Facility: CLINIC | Age: 20
End: 2025-04-07

## 2025-04-07 VITALS — BODY MASS INDEX: 32.61 KG/M2 | SYSTOLIC BLOOD PRESSURE: 122 MMHG | WEIGHT: 199 LBS | DIASTOLIC BLOOD PRESSURE: 72 MMHG

## 2025-04-07 DIAGNOSIS — Z78.9 NONIMMUNE TO HEPATITIS B VIRUS: ICD-10-CM

## 2025-04-07 DIAGNOSIS — Z34.02 PRENATAL CARE, FIRST PREGNANCY IN SECOND TRIMESTER: Primary | ICD-10-CM

## 2025-04-07 DIAGNOSIS — Z3A.20 20 WEEKS GESTATION OF PREGNANCY: ICD-10-CM

## 2025-04-07 DIAGNOSIS — R82.71 GBS BACTERIURIA: ICD-10-CM

## 2025-04-07 PROCEDURE — PNV: Performed by: NURSE PRACTITIONER

## 2025-04-24 ENCOUNTER — ROUTINE PRENATAL (OUTPATIENT)
Dept: PERINATAL CARE | Facility: OTHER | Age: 20
End: 2025-04-24
Payer: COMMERCIAL

## 2025-04-24 VITALS
BODY MASS INDEX: 33.03 KG/M2 | HEIGHT: 66 IN | SYSTOLIC BLOOD PRESSURE: 124 MMHG | HEART RATE: 91 BPM | WEIGHT: 205.5 LBS | DIASTOLIC BLOOD PRESSURE: 62 MMHG

## 2025-04-24 DIAGNOSIS — Z36.3 ENCOUNTER FOR ANTENATAL SCREENING FOR MALFORMATIONS: Primary | ICD-10-CM

## 2025-04-24 DIAGNOSIS — Z3A.22 22 WEEKS GESTATION OF PREGNANCY: ICD-10-CM

## 2025-04-24 DIAGNOSIS — Z36.86 ENCOUNTER FOR ANTENATAL SCREENING FOR CERVICAL LENGTH: ICD-10-CM

## 2025-04-24 PROCEDURE — 76805 OB US >/= 14 WKS SNGL FETUS: CPT | Performed by: OBSTETRICS & GYNECOLOGY

## 2025-04-24 PROCEDURE — 99212 OFFICE O/P EST SF 10 MIN: CPT | Performed by: OBSTETRICS & GYNECOLOGY

## 2025-04-24 PROCEDURE — 76817 TRANSVAGINAL US OBSTETRIC: CPT | Performed by: OBSTETRICS & GYNECOLOGY

## 2025-04-24 NOTE — PROGRESS NOTES
"[facility]: Jody Randhawa was seen today for anatomic survey and cervical length screening ultrasound.  See ultrasound report under \"OB Procedures\" tab.   Please don't hesitate to contact our office with any concerns or questions.  -Guillermina Cartagena MD      "

## 2025-04-24 NOTE — PROGRESS NOTES
Ultrasound Probe Disinfection    A transvaginal ultrasound was performed.   Prior to use, disinfection was performed with High Level Disinfection Process ([employer]).  Probe serial number F2: [id number] was used.    Lizabeth Mc  04/24/25  7:53 AM

## 2025-04-28 PROBLEM — Z3A.23 23 WEEKS GESTATION OF PREGNANCY: Status: ACTIVE | Noted: 2025-01-06

## 2025-04-28 NOTE — ASSESSMENT & PLAN NOTE
Continue prenatal vitamin daily  28-week labs ordered.  Encouraged to complete blood work prior to next appointment  Common discomforts of pregnancy and precautions reviewed.  Signs and symptoms to report reviewed.    Orders:    Anemia Panel w/Reflex, OB; Future    CBC and differential; Future    Glucose, 1H PG; Future

## 2025-04-28 NOTE — PROGRESS NOTES
Name: Jody Randhawa      : 2005      MRN: [id number]  Encounter Provider: USMAN Huerta  Encounter Date: 2025   Encounter [facility]  :  Assessment & Plan  23 weeks gestation of pregnancy  Continue prenatal vitamin daily  28-week labs ordered.  Encouraged to complete blood work prior to next appointment  Common discomforts of pregnancy and precautions reviewed.  Signs and symptoms to report reviewed.    Orders:    Anemia Panel w/Reflex, OB; Future    CBC and differential; Future    Glucose, 1H PG; Future        Prenatal care, first pregnancy in second trimester    Orders:    Anemia Panel w/Reflex, OB; Future    CBC and differential; Future    Glucose, 1H PG; Future    GBS bacteriuria  Will need treatment penicillin during labor         Nonimmune to hepatitis B virus  Recommend hepatitis B booster during pregnancy             RTO 4 weeks     History of Present Illness   HPI  Jody Randhawa is a 19 y.o. female who presents  for PNV      Denies loss of fluid, vaginal bleeding and abdominal pain.  Confirms frequent fetal movement.  Tolerating prenatal vitamin well.    History obtained from: patient    Review of Systems   Constitutional:  Negative for chills and fever.   Respiratory: Negative.     Cardiovascular: Negative.      Medical History Reviewed by provider this encounter:  Allergies  Meds  Problems     .     Objective   /62   LMP 2024 (Exact Date)      Physical Exam  Constitutional:       Appearance: Normal appearance.   Neurological:      Mental Status: She is alert and oriented to person, place, and time.   Psychiatric:         Mood and Affect: Mood normal.         Behavior: Behavior normal.

## 2025-04-30 ENCOUNTER — ROUTINE PRENATAL (OUTPATIENT)
Dept: OBGYN CLINIC | Facility: MEDICAL CENTER | Age: 20
End: 2025-04-30

## 2025-04-30 VITALS — BODY MASS INDEX: 34.41 KG/M2 | SYSTOLIC BLOOD PRESSURE: 102 MMHG | WEIGHT: 210 LBS | DIASTOLIC BLOOD PRESSURE: 62 MMHG

## 2025-04-30 DIAGNOSIS — Z34.02 PRENATAL CARE, FIRST PREGNANCY IN SECOND TRIMESTER: Primary | ICD-10-CM

## 2025-04-30 DIAGNOSIS — Z3A.23 23 WEEKS GESTATION OF PREGNANCY: ICD-10-CM

## 2025-04-30 DIAGNOSIS — Z78.9 NONIMMUNE TO HEPATITIS B VIRUS: ICD-10-CM

## 2025-04-30 DIAGNOSIS — R82.71 GBS BACTERIURIA: ICD-10-CM

## 2025-04-30 PROCEDURE — PNV: Performed by: NURSE PRACTITIONER

## 2025-05-23 PROBLEM — Z3A.27 27 WEEKS GESTATION OF PREGNANCY: Status: ACTIVE | Noted: 2025-01-06

## 2025-05-23 NOTE — PROGRESS NOTES
Routine Prenatal Visit  [facility] [address], [place], [place]      OB/GYN Prenatal Visit    ASSESSMENT / PLAN:  1. 27 weeks gestation of pregnancy  Assessment & Plan:  Declined the NIPT  Awaiting the 28 week labs    Level 2 reviewed - no further scans.   2. GBS bacteriuria  Assessment & Plan:  Treat in labor         SUBJECTIVE:  Jody Randhawa is a 19 y.o.  at 28 here for prenatal visit.  She has no obstetric complaints and denies pelvic pain, cramping/contractions, vaginal bleeding, loss of fluid, or decreased fetal movement.       The following portions of the patient's history were reviewed and updated as appropriate: allergies, current medications, past family history, past medical history, obstetric history, gynecologic history, past social history, past surgical history and problem list.      Objective:  /70   Wt 98.9 kg (218 lb)   LMP 2024 (Exact Date)   BMI 35.73 kg/m²   Pregravid Weight/BMI: 82.1 kg (181 lb) (BMI 29.65)  Current Weight: 98.9 kg (218 lb)   Total Weight Gain: 16.8 kg (37 lb)   Pre- Vitals      Flowsheet Row Most Recent Value   Prenatal Assessment    Fetal Heart Rate 141   Movement Present   Prenatal Vitals    Blood Pressure 104/70   Weight - Scale 98.9 kg (218 lb)   Urine Albumin/Glucose    Dilation/Effacement/Station    Vaginal Drainage    Draining Fluid No   Edema    LLE Edema None   RLE Edema None   Facial Edema None               Physical Exam:    General: Well appearing, no distress  Respiratory: Unlabored breathing  Cardiovascular: Regular rate.  Abdomen: Soft, gravid, nontender  Fundal Height: Appropriate for gestational age.  Extremities: Warm and well perfused.  Non tender.      Edinson Peterson MD

## 2025-05-27 ENCOUNTER — PATIENT MESSAGE (OUTPATIENT)
Dept: OBGYN CLINIC | Facility: MEDICAL CENTER | Age: 20
End: 2025-05-27

## 2025-05-28 ENCOUNTER — ROUTINE PRENATAL (OUTPATIENT)
Dept: OBGYN CLINIC | Facility: MEDICAL CENTER | Age: 20
End: 2025-05-28

## 2025-05-28 VITALS — SYSTOLIC BLOOD PRESSURE: 104 MMHG | DIASTOLIC BLOOD PRESSURE: 70 MMHG | WEIGHT: 218 LBS | BODY MASS INDEX: 35.73 KG/M2

## 2025-05-28 DIAGNOSIS — R82.71 GBS BACTERIURIA: ICD-10-CM

## 2025-05-28 DIAGNOSIS — Z3A.27 27 WEEKS GESTATION OF PREGNANCY: Primary | ICD-10-CM

## 2025-05-28 PROCEDURE — PNV: Performed by: OBSTETRICS & GYNECOLOGY

## 2025-05-29 ENCOUNTER — APPOINTMENT (OUTPATIENT)
Dept: LAB | Facility: MEDICAL CENTER | Age: 20
End: 2025-05-29
Payer: COMMERCIAL

## 2025-05-29 DIAGNOSIS — Z3A.23 23 WEEKS GESTATION OF PREGNANCY: ICD-10-CM

## 2025-05-29 DIAGNOSIS — Z34.02 PRENATAL CARE, FIRST PREGNANCY IN SECOND TRIMESTER: ICD-10-CM

## 2025-05-29 LAB
BASOPHILS # BLD AUTO: 0.02 THOUSANDS/ÂΜL (ref 0–0.1)
BASOPHILS NFR BLD AUTO: 0 % (ref 0–1)
EOSINOPHIL # BLD AUTO: 0.06 THOUSAND/ÂΜL (ref 0–0.61)
EOSINOPHIL NFR BLD AUTO: 1 % (ref 0–6)
ERYTHROCYTE [DISTWIDTH] IN BLOOD BY AUTOMATED COUNT: 13 % (ref 11.6–15.1)
GLUCOSE 1H P 50 G GLC PO SERPL-MCNC: 106 MG/DL (ref 70–134)
HCT VFR BLD AUTO: 35.6 % (ref 34.8–46.1)
HGB BLD-MCNC: 11.5 G/DL (ref 11.5–15.4)
IMM GRANULOCYTES # BLD AUTO: 0.08 THOUSAND/UL (ref 0–0.2)
IMM GRANULOCYTES NFR BLD AUTO: 1 % (ref 0–2)
LYMPHOCYTES # BLD AUTO: 1.3 THOUSANDS/ÂΜL (ref 0.6–4.47)
LYMPHOCYTES NFR BLD AUTO: 17 % (ref 14–44)
MCH RBC QN AUTO: 28.8 PG (ref 26.8–34.3)
MCHC RBC AUTO-ENTMCNC: 32.3 G/DL (ref 31.4–37.4)
MCV RBC AUTO: 89 FL (ref 82–98)
MONOCYTES # BLD AUTO: 0.59 THOUSAND/ÂΜL (ref 0.17–1.22)
MONOCYTES NFR BLD AUTO: 8 % (ref 4–12)
NEUTROPHILS # BLD AUTO: 5.82 THOUSANDS/ÂΜL (ref 1.85–7.62)
NEUTS SEG NFR BLD AUTO: 73 % (ref 43–75)
NRBC BLD AUTO-RTO: 0 /100 WBCS
PLATELET # BLD AUTO: 141 THOUSANDS/UL (ref 149–390)
PMV BLD AUTO: 11.7 FL (ref 8.9–12.7)
RBC # BLD AUTO: 4 MILLION/UL (ref 3.81–5.12)
WBC # BLD AUTO: 7.87 THOUSAND/UL (ref 4.31–10.16)

## 2025-05-29 PROCEDURE — 36415 COLL VENOUS BLD VENIPUNCTURE: CPT

## 2025-05-29 PROCEDURE — 82950 GLUCOSE TEST: CPT

## 2025-05-29 PROCEDURE — 85025 COMPLETE CBC W/AUTO DIFF WBC: CPT

## 2025-05-30 ENCOUNTER — RESULTS FOLLOW-UP (OUTPATIENT)
Dept: OBGYN CLINIC | Facility: MEDICAL CENTER | Age: 20
End: 2025-05-30

## 2025-05-30 DIAGNOSIS — O99.113: Primary | ICD-10-CM

## 2025-05-30 DIAGNOSIS — D69.59: ICD-10-CM

## 2025-05-30 DIAGNOSIS — D69.59: Primary | ICD-10-CM

## 2025-05-30 DIAGNOSIS — O99.113: ICD-10-CM

## 2025-06-03 LAB
DME PARACHUTE DELIVERY DATE ACTUAL: NORMAL
DME PARACHUTE DELIVERY DATE REQUESTED: NORMAL
DME PARACHUTE ITEM DESCRIPTION: NORMAL
DME PARACHUTE ORDER STATUS: NORMAL
DME PARACHUTE SUPPLIER NAME: NORMAL
DME PARACHUTE SUPPLIER PHONE: NORMAL

## 2025-06-10 ENCOUNTER — ROUTINE PRENATAL (OUTPATIENT)
Dept: OBGYN CLINIC | Facility: MEDICAL CENTER | Age: 20
End: 2025-06-10

## 2025-06-10 VITALS — SYSTOLIC BLOOD PRESSURE: 122 MMHG | DIASTOLIC BLOOD PRESSURE: 70 MMHG | WEIGHT: 221 LBS | BODY MASS INDEX: 36.22 KG/M2

## 2025-06-10 DIAGNOSIS — R82.71 GBS BACTERIURIA: ICD-10-CM

## 2025-06-10 DIAGNOSIS — Z3A.29 29 WEEKS GESTATION OF PREGNANCY: Primary | ICD-10-CM

## 2025-06-10 PROCEDURE — PNV: Performed by: OBSTETRICS & GYNECOLOGY

## 2025-06-10 NOTE — PROGRESS NOTES
2nd Trimester Visit  Name: Jody Randhawa      : 2005      MRN: [id number]  Encounter Provider: Yenny Green MD  Encounter Date: 6/10/2025   Encounter department: [facility]    19 y.o.  at 29w1d presenting for routine OB visit at 29w1d.:  Assessment & Plan  GBS bacteriuria       will need abx in labor   29 weeks gestation of pregnancy       FKC and  PTL precautions  reviewed   Has  cold  -  reviewed  OTC meds to  take   Interested in  starting  leave 1 month prior to due date  -  we discussed  from medical  standpoint we certainly  can write  her  our for this is medical indication were to  arise  , otherwise no medical indication we fill  her  FMLA with due date    Aware if  job  accomodates this she can  certainly  speak  to her  HR   Normal  28 week labs  - except her  platelet  count slightly  low at  141  - we discussed repeating cbc at  36 weeks   Consent fir delivery  returned today as well as  birth plan        Reviewed common discomforts of pregnancy in second trimester and warning signs.  Advised to continue medications and return in 2 weeks.    History of Present Illness     She reports having a cold.  Denies uterine contractions or persistent cramping.  Denies vaginal bleeding or leaking of fluid.  Reports fetal movement.         Current Outpatient Medications   Medication Instructions    CHOLINE PO 1 tablet, Daily    Prenatal Vit-DSS-Fe Cbn-FA (PRENATAL AD PO) Take by mouth     Objective   /70   Wt 100 kg (221 lb)   LMP 2024 (Exact Date)   BMI 36.22 kg/m²      BP: Blood Pressure: 122/70  Wt: 100 kg (221 lb); Body mass index is 36.22 kg/m².; TWG=18.1 kg (40 lb)  Fetal Heart Rate: 149;      Abdomen: ”soft”,”non tender” Gravid          Pregnancy Problems (from 25 to present)       No problems associated with this episode.

## 2025-06-24 ENCOUNTER — ROUTINE PRENATAL (OUTPATIENT)
Dept: OBGYN CLINIC | Facility: MEDICAL CENTER | Age: 20
End: 2025-06-24

## 2025-06-24 ENCOUNTER — TELEPHONE (OUTPATIENT)
Age: 20
End: 2025-06-24

## 2025-06-24 VITALS — BODY MASS INDEX: 36.77 KG/M2 | WEIGHT: 224.4 LBS | SYSTOLIC BLOOD PRESSURE: 112 MMHG | DIASTOLIC BLOOD PRESSURE: 66 MMHG

## 2025-06-24 DIAGNOSIS — Z34.93 THIRD TRIMESTER PREGNANCY: Primary | ICD-10-CM

## 2025-06-24 DIAGNOSIS — D69.59: ICD-10-CM

## 2025-06-24 DIAGNOSIS — R82.71 GBS BACTERIURIA: ICD-10-CM

## 2025-06-24 DIAGNOSIS — Z3A.31 31 WEEKS GESTATION OF PREGNANCY: ICD-10-CM

## 2025-06-24 DIAGNOSIS — Z78.9 NONIMMUNE TO HEPATITIS B VIRUS: ICD-10-CM

## 2025-06-24 DIAGNOSIS — O99.113: ICD-10-CM

## 2025-06-24 PROCEDURE — PNV: Performed by: OBSTETRICS & GYNECOLOGY

## 2025-06-24 NOTE — PROGRESS NOTES
Assessment  19 y.o.  at 31w1d presenting for routine prenatal visit.     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  at 31w1d who presents for routine prenatal visit. She is noting pelvic pressure, manageable but increasing with time. Denies contractions, loss of fluid, or vaginal bleeding. She feels regular fetal movements.     Pregnancy Problems:  Problem List[1]      Objective  /66   Wt 102 kg (224 lb 6.4 oz)   LMP 2024 (Exact Date)   BMI 36.77 kg/m²     FHT: 130 BPM   Uterine Size: 31 cm     Physical Exam:  Physical Exam  Constitutional:       General: She is not in acute distress.     Appearance: Normal appearance. She is well-developed. She is not ill-appearing, toxic-appearing or diaphoretic.   HENT:      Head: Normocephalic and atraumatic.     Eyes:      General: No scleral icterus.        Right eye: No discharge.         Left eye: No discharge.      Conjunctiva/sclera: Conjunctivae normal.     Pulmonary:      Effort: Pulmonary effort is normal. No accessory muscle usage or respiratory distress.   Abdominal:      General: There is distension (gravid).      Tenderness: There is no abdominal tenderness. There is no guarding or rebound.     Skin:     General: Skin is warm and dry.      Coloration: Skin is not jaundiced.      Findings: No bruising, erythema or rash.     Neurological:      Mental Status: She is alert.     Psychiatric:         Mood and Affect: Mood  normal.         Behavior: Behavior normal.         Thought Content: Thought content normal.         Judgment: Judgment normal.                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

## 2025-06-24 NOTE — TELEPHONE ENCOUNTER
3RD TRIMESTER CHECK-IN CALL      Overall how are you feeling? Patient states she is doing well.    Compliant with routine OB appointments? Yes    Have you completed your 3rd trimester lab work? Yes    Have you reviewed the contents of 3rd trimester folder from office?  Yes    Have you decided on a pediatrician? Unable to talk at this time    Questions on paperwork to go back to office? No, forms have been returned to office.    Questions on the baby birth certificate forms? No, forms have been returned to office.    Sent link for the Hospital Readiness Video via [employer]

## 2025-06-28 ENCOUNTER — CLINICAL SUPPORT (OUTPATIENT)
Dept: POSTPARTUM | Facility: CLINIC | Age: 20
End: 2025-06-28

## 2025-06-28 DIAGNOSIS — Z32.2 ENCOUNTER FOR CHILDBIRTH INSTRUCTION: Primary | ICD-10-CM

## 2025-07-08 PROBLEM — Z3A.33 33 WEEKS GESTATION OF PREGNANCY: Status: ACTIVE | Noted: 2025-01-06

## 2025-07-08 NOTE — ASSESSMENT & PLAN NOTE
Continue prenatal vitamin daily  Continue fetal kick counts daily  Common discomforts of pregnancy and precautions reviewed.  Signs and symptoms to report reviewed.

## 2025-07-08 NOTE — PROGRESS NOTES
Name: Jody Randhawa      : 2005      MRN: [id number]  Encounter Provider: USMAN Huerta  Encounter Date: 7/10/2025   Encounter department: [facility]  :  Assessment & Plan  33 weeks gestation of pregnancy  Continue prenatal vitamin daily  Continue fetal kick counts daily  Common discomforts of pregnancy and precautions reviewed.  Signs and symptoms to report reviewed.         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 female who presents for PNV     Denies loss of fluid, vaginal bleeding and abdominal pain.  Confirms frequent fetal movement.  Doing fetal kick counts.  Tolerating prenatal vitamin well    History obtained from: patient    Review of Systems   Constitutional:  Negative for chills and fever.   Respiratory: Negative.     Cardiovascular: Negative.    Genitourinary: Negative.      Medical History Reviewed by provider this encounter:  Allergies  Meds     .     Objective   /72   Wt 106 kg (232 lb 9.6 oz)   LMP 2024 (Exact Date)   BMI 38.12 kg/m²      Physical Exam  Constitutional:       Appearance: Normal appearance.   Pulmonary:      Effort: Pulmonary effort is normal.     Neurological:      Mental Status: She is alert and oriented to person, place, and time.     Psychiatric:         Mood and Affect: Mood normal.         Behavior: Behavior normal.

## 2025-07-10 ENCOUNTER — PATIENT MESSAGE (OUTPATIENT)
Dept: OBGYN CLINIC | Facility: MEDICAL CENTER | Age: 20
End: 2025-07-10

## 2025-07-10 ENCOUNTER — ROUTINE PRENATAL (OUTPATIENT)
Dept: OBGYN CLINIC | Facility: MEDICAL CENTER | Age: 20
End: 2025-07-10

## 2025-07-10 VITALS — BODY MASS INDEX: 38.12 KG/M2 | DIASTOLIC BLOOD PRESSURE: 72 MMHG | SYSTOLIC BLOOD PRESSURE: 120 MMHG | WEIGHT: 232.6 LBS

## 2025-07-10 DIAGNOSIS — Z78.9 NONIMMUNE TO HEPATITIS B VIRUS: ICD-10-CM

## 2025-07-10 DIAGNOSIS — R82.71 GBS BACTERIURIA: ICD-10-CM

## 2025-07-10 DIAGNOSIS — O99.113: Primary | ICD-10-CM

## 2025-07-10 DIAGNOSIS — D69.59: Primary | ICD-10-CM

## 2025-07-10 DIAGNOSIS — Z3A.33 33 WEEKS GESTATION OF PREGNANCY: ICD-10-CM

## 2025-07-10 PROCEDURE — PNV: Performed by: NURSE PRACTITIONER

## 2025-07-10 NOTE — PATIENT COMMUNICATION
Pt attempting to return call. No answer from office to reschedule 08/12 visit. Pt aware of message sent.

## 2025-07-24 ENCOUNTER — NURSE TRIAGE (OUTPATIENT)
Age: 20
End: 2025-07-24

## 2025-07-24 NOTE — TELEPHONE ENCOUNTER
"REASON FOR CONVERSATION: Vaginal Discharge 3rd Trimester Pregnancy    SYMPTOMS: 35w3d OB reports possible piece of mucous plug came out. Clear-yellow chunky glob smaller than quarter    OTHER HEALTH INFORMATION: Denies other s/s labor.     Reports good fetal movement.     Denies vaginal bleeding, s/s vaginitis, fevers/chills, nausea/vomiting    ESC to Nichole Allen on call. Agreed with recommendations.    PROTOCOL DISPOSITION: Discuss with Provider and Call Back Patient (overriding Go to LD Now (Or to Office With PCP Approval))    CARE ADVICE PROVIDED: Can be normal to see pieces of mucous plug come out, not always indicative of impending labor. Mucous plug can re-generate itself. call back if further instances of mucous like discharge. Call back with vaginal bleeding, leakage of fluid, contractions, decreased fetal movement.     PRACTICE FOLLOW-UP: none.     Reason for Disposition   Pregnant 24 - 36 weeks () and pinkish or brownish mucous discharge  (Exception: Single episode of faint spotting when wiping, or slight spotting after intercourse or pelvic exam.)    Answer Assessment - Initial Assessment Questions  1. DISCHARGE: \"Describe the discharge.\" (e.g., white, yellow, green, gray, foamy, cottage cheese-like)      Clear-yellow chunky mucous like glob, smaller than quarter     3. ONSET: \"When did the discharge begin?\"      Last night    5. ABDOMEN PAIN: \"Are you having any abdomen pain?\" If Yes, ask: \"What does it feel like?\" (e.g., crampy, dull, intermittent, constant)       Denies pain or cramping    7. CAUSE: \"What do you think is causing the discharge?\"      Thinks part of mucous plug    8. OTHER SYMPTOMS: \"Do you have any other symptoms?\" (e.g., fever, itching, vaginal bleeding, pain with urination)      Denies vaginal bleeding, leakage of fluid, decreased fetal movement, fevers, chills, nausea/vomiting, itching, irritation     9. GABRIELA: \"What date are you expecting to deliver?\"        per chart " "review    10. PREGNANCY: \"How many weeks pregnant are you?\"        35w3    Protocols used: Pregnancy - Vaginal Discharge-Adult-[place]    "

## 2025-07-28 ENCOUNTER — APPOINTMENT (OUTPATIENT)
Dept: LAB | Facility: MEDICAL CENTER | Age: 20
End: 2025-07-28
Attending: NURSE PRACTITIONER
Payer: COMMERCIAL

## 2025-07-28 ENCOUNTER — ROUTINE PRENATAL (OUTPATIENT)
Dept: OBGYN CLINIC | Facility: MEDICAL CENTER | Age: 20
End: 2025-07-28

## 2025-07-28 VITALS — DIASTOLIC BLOOD PRESSURE: 76 MMHG | BODY MASS INDEX: 40.15 KG/M2 | SYSTOLIC BLOOD PRESSURE: 120 MMHG | WEIGHT: 245 LBS

## 2025-07-28 DIAGNOSIS — D69.59: ICD-10-CM

## 2025-07-28 DIAGNOSIS — O99.113: ICD-10-CM

## 2025-07-28 DIAGNOSIS — R82.71 GBS BACTERIURIA: ICD-10-CM

## 2025-07-28 DIAGNOSIS — Z3A.36 36 WEEKS GESTATION OF PREGNANCY: Primary | ICD-10-CM

## 2025-07-28 LAB
BASOPHILS # BLD AUTO: 0.03 THOUSANDS/ÂΜL (ref 0–0.1)
BASOPHILS NFR BLD AUTO: 0 % (ref 0–1)
EOSINOPHIL # BLD AUTO: 0.1 THOUSAND/ÂΜL (ref 0–0.61)
EOSINOPHIL NFR BLD AUTO: 1 % (ref 0–6)
ERYTHROCYTE [DISTWIDTH] IN BLOOD BY AUTOMATED COUNT: 13.5 % (ref 11.6–15.1)
HCT VFR BLD AUTO: 36.1 % (ref 34.8–46.1)
HGB BLD-MCNC: 11.9 G/DL (ref 11.5–15.4)
IMM GRANULOCYTES # BLD AUTO: 0.06 THOUSAND/UL (ref 0–0.2)
IMM GRANULOCYTES NFR BLD AUTO: 1 % (ref 0–2)
LYMPHOCYTES # BLD AUTO: 1.9 THOUSANDS/ÂΜL (ref 0.6–4.47)
LYMPHOCYTES NFR BLD AUTO: 24 % (ref 14–44)
MCH RBC QN AUTO: 28.1 PG (ref 26.8–34.3)
MCHC RBC AUTO-ENTMCNC: 33 G/DL (ref 31.4–37.4)
MCV RBC AUTO: 85 FL (ref 82–98)
MONOCYTES # BLD AUTO: 0.84 THOUSAND/ÂΜL (ref 0.17–1.22)
MONOCYTES NFR BLD AUTO: 11 % (ref 4–12)
NEUTROPHILS # BLD AUTO: 5.1 THOUSANDS/ÂΜL (ref 1.85–7.62)
NEUTS SEG NFR BLD AUTO: 63 % (ref 43–75)
NRBC BLD AUTO-RTO: 0 /100 WBCS
PLATELET # BLD AUTO: 150 THOUSANDS/UL (ref 149–390)
PMV BLD AUTO: 12.3 FL (ref 8.9–12.7)
RBC # BLD AUTO: 4.23 MILLION/UL (ref 3.81–5.12)
WBC # BLD AUTO: 8.03 THOUSAND/UL (ref 4.31–10.16)

## 2025-07-28 PROCEDURE — 85025 COMPLETE CBC W/AUTO DIFF WBC: CPT

## 2025-07-28 PROCEDURE — PNV: Performed by: OBSTETRICS & GYNECOLOGY

## 2025-07-28 PROCEDURE — 36415 COLL VENOUS BLD VENIPUNCTURE: CPT

## 2025-07-30 ENCOUNTER — CLINICAL SUPPORT (OUTPATIENT)
Dept: POSTPARTUM | Facility: CLINIC | Age: 20
End: 2025-07-30

## 2025-07-30 DIAGNOSIS — Z32.2 ENCOUNTER FOR CHILDBIRTH INSTRUCTION: Primary | ICD-10-CM

## 2025-08-02 ENCOUNTER — CLINICAL SUPPORT (OUTPATIENT)
Age: 20
End: 2025-08-02

## 2025-08-02 DIAGNOSIS — Z32.2 ENCOUNTER FOR CHILDBIRTH INSTRUCTION: Primary | ICD-10-CM

## 2025-08-05 PROBLEM — Z3A.37 37 WEEKS GESTATION OF PREGNANCY: Status: ACTIVE | Noted: 2025-01-06

## 2025-08-11 ENCOUNTER — CLINICAL SUPPORT (OUTPATIENT)
Age: 20
End: 2025-08-11

## 2025-08-13 ENCOUNTER — CLINICAL SUPPORT (OUTPATIENT)
Dept: POSTPARTUM | Facility: CLINIC | Age: 20
End: 2025-08-13

## 2025-08-14 ENCOUNTER — ROUTINE PRENATAL (OUTPATIENT)
Dept: OBGYN CLINIC | Facility: MEDICAL CENTER | Age: 20
End: 2025-08-14

## 2025-08-14 VITALS — WEIGHT: 251.9 LBS | BODY MASS INDEX: 41.28 KG/M2 | DIASTOLIC BLOOD PRESSURE: 70 MMHG | SYSTOLIC BLOOD PRESSURE: 124 MMHG

## 2025-08-14 DIAGNOSIS — O99.113: Primary | ICD-10-CM

## 2025-08-14 DIAGNOSIS — D69.59: Primary | ICD-10-CM

## 2025-08-14 DIAGNOSIS — Z3A.39 39 WEEKS GESTATION OF PREGNANCY: ICD-10-CM

## 2025-08-14 PROCEDURE — PNV: Performed by: STUDENT IN AN ORGANIZED HEALTH CARE EDUCATION/TRAINING PROGRAM

## 2025-08-19 ENCOUNTER — ROUTINE PRENATAL (OUTPATIENT)
Dept: OBGYN CLINIC | Facility: MEDICAL CENTER | Age: 20
End: 2025-08-19
Payer: COMMERCIAL

## 2025-08-19 ENCOUNTER — NURSE TRIAGE (OUTPATIENT)
Age: 20
End: 2025-08-19

## 2025-08-19 VITALS — WEIGHT: 252.6 LBS | SYSTOLIC BLOOD PRESSURE: 114 MMHG | BODY MASS INDEX: 41.4 KG/M2 | DIASTOLIC BLOOD PRESSURE: 84 MMHG

## 2025-08-19 DIAGNOSIS — R82.71 GBS BACTERIURIA: ICD-10-CM

## 2025-08-19 DIAGNOSIS — Z78.9 NONIMMUNE TO HEPATITIS B VIRUS: ICD-10-CM

## 2025-08-19 DIAGNOSIS — Z3A.39 39 WEEKS GESTATION OF PREGNANCY: ICD-10-CM

## 2025-08-19 DIAGNOSIS — O99.113: ICD-10-CM

## 2025-08-19 DIAGNOSIS — D69.59: ICD-10-CM

## 2025-08-19 DIAGNOSIS — Z34.93 THIRD TRIMESTER PREGNANCY: Primary | ICD-10-CM

## 2025-08-19 DIAGNOSIS — O36.8130 DISORDER OF FETAL MOVEMENT IN THIRD TRIMESTER, SINGLE OR UNSPECIFIED FETUS: ICD-10-CM

## 2025-08-19 PROCEDURE — PNV: Performed by: OBSTETRICS & GYNECOLOGY

## 2025-08-19 PROCEDURE — 76815 OB US LIMITED FETUS(S): CPT | Performed by: OBSTETRICS & GYNECOLOGY

## 2025-08-21 ENCOUNTER — ROUTINE PRENATAL (OUTPATIENT)
Dept: OBGYN CLINIC | Facility: MEDICAL CENTER | Age: 20
End: 2025-08-21

## 2025-08-21 VITALS — DIASTOLIC BLOOD PRESSURE: 70 MMHG | WEIGHT: 251.8 LBS | SYSTOLIC BLOOD PRESSURE: 110 MMHG | BODY MASS INDEX: 41.26 KG/M2

## 2025-08-21 DIAGNOSIS — R82.71 GBS BACTERIURIA: ICD-10-CM

## 2025-08-21 DIAGNOSIS — D69.59: Primary | ICD-10-CM

## 2025-08-21 DIAGNOSIS — Z3A.39 39 WEEKS GESTATION OF PREGNANCY: ICD-10-CM

## 2025-08-21 DIAGNOSIS — O99.113: Primary | ICD-10-CM

## 2025-08-21 PROCEDURE — PNV: Performed by: STUDENT IN AN ORGANIZED HEALTH CARE EDUCATION/TRAINING PROGRAM

## 2025-08-25 PROBLEM — O42.90 AMNIOTIC FLUID LEAKING: Status: ACTIVE | Noted: 2025-08-25

## 2025-08-25 PROBLEM — Z3A.40 40 WEEKS GESTATION OF PREGNANCY: Status: ACTIVE | Noted: 2025-01-06

## 2025-08-25 PROBLEM — E66.01 MORBID OBESITY WITH BMI OF 40.0-44.9, ADULT (HCC): Status: ACTIVE | Noted: 2025-08-25
